# Patient Record
Sex: FEMALE | Race: WHITE | NOT HISPANIC OR LATINO
[De-identification: names, ages, dates, MRNs, and addresses within clinical notes are randomized per-mention and may not be internally consistent; named-entity substitution may affect disease eponyms.]

---

## 2021-09-10 PROBLEM — Z00.00 ENCOUNTER FOR PREVENTIVE HEALTH EXAMINATION: Status: ACTIVE | Noted: 2021-09-10

## 2021-10-13 ENCOUNTER — APPOINTMENT (OUTPATIENT)
Dept: SURGERY | Facility: CLINIC | Age: 76
End: 2021-10-13

## 2022-09-26 ENCOUNTER — TRANSCRIPTION ENCOUNTER (OUTPATIENT)
Age: 77
End: 2022-09-26

## 2023-08-08 ENCOUNTER — NON-APPOINTMENT (OUTPATIENT)
Age: 78
End: 2023-08-08

## 2023-08-08 ENCOUNTER — APPOINTMENT (OUTPATIENT)
Dept: VASCULAR SURGERY | Facility: CLINIC | Age: 78
End: 2023-08-08
Payer: MEDICARE

## 2023-08-08 VITALS — BODY MASS INDEX: 27.97 KG/M2 | WEIGHT: 152 LBS | HEIGHT: 62 IN

## 2023-08-08 PROCEDURE — 99204 OFFICE O/P NEW MOD 45 MIN: CPT | Mod: 57

## 2023-08-08 NOTE — REASON FOR VISIT
[Initial Evaluation] : an initial evaluation [Family Member] : family member [FreeTextEntry1] : RLE pain

## 2023-08-08 NOTE — DATA REVIEWED
[FreeTextEntry1] : Right lower extremity venous duplex performed on 7/20/23 - reviewed.   ELAN 0.5 on R.

## 2023-08-08 NOTE — HISTORY OF PRESENT ILLNESS
[FreeTextEntry1] : 78 yo female presents for an evaluation of right lower extremity pain at rest. The patient develops severe pain in her right foot when she is laying down at night or with elevates the leg The pain wakes her up at night, requiring her to dangle her foot off the bed  or ambulate to shake off the pain. She underwent a cardiac ablation via left femoral access. She reports that they intended but were unable to access via right access.  The patient currently take Eliquis to manage her AF. The patient has a history of endometrial CA, SBOs requiring bowel resection, ventral hernias, mesh infeciton with sepsis, hypercalcemia.  Review of systems: 12 system ROS is negative except for as per HPI.

## 2023-08-08 NOTE — PHYSICAL EXAM
[Normal Thyroid] : the thyroid was normal [0] : right 0 [Calm] : calm [2+] : left 2+ [Ankle Swelling (On Exam)] : present [Varicose Veins Of Lower Extremities] : present [JVD] : no jugular venous distention  [] : not present [de-identified] : NAD [de-identified] : NCAT. Extraocular muscles intact. [FreeTextEntry1] : biphasic dp signal on the right [de-identified] : Soft, NT, ND. No guarding. No palpable masses. No HSM. [de-identified] : No CVA tenderness. [de-identified] : Normal muscle bulk and tone. FROM in all extremities. [de-identified] : AAOx3, CN II-XII intact. Strength 5/5 in all 4 extremities. Sensation intact throughout. [de-identified] : Appropriate affect.

## 2023-08-08 NOTE — ASSESSMENT
[FreeTextEntry1] : 78 yo female with a history of AF and endometrial CA, presents for right lower extremity PAD with rest pain. The patient does not have palpable pulses on the right and diminished ELAN. I recommended the patient undergo an angiogram and possible angioplasty. Risks and benefits of the procedure were discussed with the patient and her daughter in detail. The patient agrees to proceed.   She was instructed to stop Eliquis 48 hours prior to the procedure.  49 min  [Arterial/Venous Disease] : arterial/venous disease

## 2023-08-15 ENCOUNTER — APPOINTMENT (OUTPATIENT)
Dept: VASCULAR SURGERY | Facility: CLINIC | Age: 78
End: 2023-08-15

## 2023-09-07 ENCOUNTER — RESULT REVIEW (OUTPATIENT)
Age: 78
End: 2023-09-07

## 2023-09-07 ENCOUNTER — APPOINTMENT (OUTPATIENT)
Dept: VASCULAR SURGERY | Facility: HOSPITAL | Age: 78
End: 2023-09-07

## 2023-09-08 ENCOUNTER — TRANSCRIPTION ENCOUNTER (OUTPATIENT)
Age: 78
End: 2023-09-08

## 2023-09-08 ENCOUNTER — RESULT REVIEW (OUTPATIENT)
Age: 78
End: 2023-09-08

## 2023-09-19 ENCOUNTER — APPOINTMENT (OUTPATIENT)
Dept: VASCULAR SURGERY | Facility: CLINIC | Age: 78
End: 2023-09-19
Payer: MEDICARE

## 2023-09-19 PROCEDURE — 99213 OFFICE O/P EST LOW 20 MIN: CPT

## 2024-02-05 ENCOUNTER — APPOINTMENT (OUTPATIENT)
Dept: VASCULAR SURGERY | Facility: CLINIC | Age: 79
End: 2024-02-05
Payer: MEDICARE

## 2024-02-05 VITALS
SYSTOLIC BLOOD PRESSURE: 162 MMHG | DIASTOLIC BLOOD PRESSURE: 90 MMHG | HEIGHT: 62 IN | WEIGHT: 158 LBS | BODY MASS INDEX: 29.08 KG/M2 | HEART RATE: 67 BPM

## 2024-02-05 DIAGNOSIS — I48.20 CHRONIC ATRIAL FIBRILLATION, UNSP: ICD-10-CM

## 2024-02-05 DIAGNOSIS — Z87.898 PERSONAL HISTORY OF OTHER SPECIFIED CONDITIONS: ICD-10-CM

## 2024-02-05 DIAGNOSIS — Z86.39 PERSONAL HISTORY OF OTHER ENDOCRINE, NUTRITIONAL AND METABOLIC DISEASE: ICD-10-CM

## 2024-02-05 DIAGNOSIS — Z87.891 PERSONAL HISTORY OF NICOTINE DEPENDENCE: ICD-10-CM

## 2024-02-05 DIAGNOSIS — G62.9 POLYNEUROPATHY, UNSPECIFIED: ICD-10-CM

## 2024-02-05 DIAGNOSIS — M79.604 PAIN IN RIGHT LEG: ICD-10-CM

## 2024-02-05 DIAGNOSIS — Z78.9 OTHER SPECIFIED HEALTH STATUS: ICD-10-CM

## 2024-02-05 DIAGNOSIS — E53.8 DEFICIENCY OF OTHER SPECIFIED B GROUP VITAMINS: ICD-10-CM

## 2024-02-05 DIAGNOSIS — Z86.59 PERSONAL HISTORY OF OTHER MENTAL AND BEHAVIORAL DISORDERS: ICD-10-CM

## 2024-02-05 DIAGNOSIS — Z85.42 PERSONAL HISTORY OF MALIGNANT NEOPLASM OF OTHER PARTS OF UTERUS: ICD-10-CM

## 2024-02-05 DIAGNOSIS — Z87.39 PERSONAL HISTORY OF OTHER DISEASES OF THE MUSCULOSKELETAL SYSTEM AND CONNECTIVE TISSUE: ICD-10-CM

## 2024-02-05 PROCEDURE — 99214 OFFICE O/P EST MOD 30 MIN: CPT

## 2024-02-05 PROCEDURE — 93922 UPR/L XTREMITY ART 2 LEVELS: CPT

## 2024-02-05 RX ORDER — ASPIRIN 81 MG
81 TABLET, DELAYED RELEASE (ENTERIC COATED) ORAL
Refills: 0 | Status: ACTIVE | COMMUNITY

## 2024-02-05 RX ORDER — LIDOCAINE AND PRILOCAINE 25; 25 MG/G; MG/G
2.5-2.5 CREAM TOPICAL
Refills: 0 | Status: ACTIVE | COMMUNITY

## 2024-02-05 RX ORDER — LANSOPRAZOLE 30 MG/1
30 CAPSULE, DELAYED RELEASE ORAL
Refills: 0 | Status: ACTIVE | COMMUNITY

## 2024-02-05 RX ORDER — STANNOUS FLUORIDE 0.45 G/100G
100 PASTE DENTAL
Refills: 0 | Status: ACTIVE | COMMUNITY

## 2024-02-05 RX ORDER — APIXABAN 5 MG/1
TABLET, FILM COATED ORAL
Refills: 0 | Status: ACTIVE | COMMUNITY

## 2024-02-05 RX ORDER — ERGOCALCIFEROL 1.25 MG/1
1.25 MG CAPSULE ORAL
Refills: 0 | Status: ACTIVE | COMMUNITY

## 2024-02-05 NOTE — END OF VISIT
[FreeTextEntry3] : All medical record entries made by the Meaghanibe were at my, BEBA DEVINE, direction and personally dictated by me on 02/05/2024. I have reviewed the chart and agree that the record accurately reflects my personal performance of the history, physical exam, assessment and plan. I have also personally directed, reviewed, and agreed with the chart.

## 2024-02-05 NOTE — ASSESSMENT
[FreeTextEntry1] : 79 yo female with a history of AFib and endometrial CA, Patient is angiogram with angioplasty and DCB of SFA on 9/7/2023 by . The patient has developed burning right lower extremity pain s/p a fall approximately 11 weeks ago. She underwent an arterial duplex that a mild stenosis of the distal SFA.  The patient has a palpable DP  pulse on the right . She has normal ABIs bl.  We discussed undergoing a repeat right lower extremity angiogram for further evaluation. I highly discouraged the patient to undergo an angiogram. The patient believes the angiogram helped her in the past. I explained to her that her ELAN was 0.5 at the time of the last procedure and it is now normal with a pulse in her foot.  Risks and benefits including infection and bleeding, were discussed with the patient in detail. I explained to the patient that her symptoms may not improve after the procedure because they may be secondary to her neuropathy. The patient would like proceed  despite my  recommendation. I recommended the patient also follow up with a neurologist.   She will need to discontinue Eliquis for the pocedure.

## 2024-02-05 NOTE — ADDENDUM
[FreeTextEntry1] : Documented by Gely Gibbons acting as a scribe for BEBA DEVINE on 02/05/2024.
Abdomen soft, non-tender, no guarding.

## 2024-02-05 NOTE — DATA REVIEWED
[FreeTextEntry1] : ELAN/PVR testing - personally reviewed - Normal ABIs bilaterally  Arterial Duplex - optum - stenosis distal SFA

## 2024-02-05 NOTE — PHYSICAL EXAM
[2+] : right 2+ [0] : left 0 [Ankle Swelling (On Exam)] : present [Ankle Swelling On The Right] : of the right ankle [Alert] : alert [Oriented to Person] : oriented to person [Oriented to Place] : oriented to place [Oriented to Time] : oriented to time [JVD] : no jugular venous distention  [] : not present [de-identified] : Awake and Alert. [de-identified] : Right shin discoloration [de-identified] : No gross motor or sensory deficits. [de-identified] : Appropriate affect.

## 2024-02-05 NOTE — REVIEW OF SYSTEMS
[Lower Ext Edema] : lower extremity edema [Limb Pain] : limb pain [Limb Swelling] : limb swelling [Fever] : no fever [Chills] : no chills [Leg Claudication] : no intermittent leg claudication [Limb Weakness] : limb weakness [Difficulty Walking] : difficulty walking

## 2024-02-05 NOTE — HISTORY OF PRESENT ILLNESS
[FreeTextEntry1] : 77 yo female presents for an evaluation of right lower extremity pain at rest. The patient develops severe pain in her right foot when she is laying down at night or with elevates the leg The pain wakes her up at night, requiring her to dangle her foot off the bed  or ambulate to shake off the pain. She underwent a cardiac ablation via left femoral access. She reports that they intended but were unable to access via right access.  The patient currently take Eliquis to manage her AF. The patient has a history of endometrial CA, SBOs requiring bowel resection, ventral hernias, mesh infeciton with sepsis, hypercalcemia.   [de-identified] : 77 yo female, previously seen by , returns in follow up. She is s/p RLE angiogram demonstrating near-occlusive sfa disease with PTA/DCB of the SFA lesions. She reports that her pain resolved completely post procedure.  The patient reports that she has worsening pain  s/p a fall approximately 11 weeks ago. She reports that she underwent an extensive work up which was negative except for a stenosis on arterial duplex. The patient reports intermittent burning pain that radiates up her right leg. She reports her symptoms do not worsen with ambulation. The patient has a history of neuropathy. She continues to take Eliquis to manage her AF.

## 2024-02-26 ENCOUNTER — RESULT REVIEW (OUTPATIENT)
Age: 79
End: 2024-02-26

## 2024-02-29 ENCOUNTER — APPOINTMENT (OUTPATIENT)
Dept: VASCULAR SURGERY | Facility: CLINIC | Age: 79
End: 2024-02-29

## 2024-03-01 ENCOUNTER — RESULT REVIEW (OUTPATIENT)
Age: 79
End: 2024-03-01

## 2024-03-01 ENCOUNTER — APPOINTMENT (OUTPATIENT)
Dept: VASCULAR SURGERY | Facility: HOSPITAL | Age: 79
End: 2024-03-01

## 2024-03-08 ENCOUNTER — TRANSCRIPTION ENCOUNTER (OUTPATIENT)
Age: 79
End: 2024-03-08

## 2024-03-15 ENCOUNTER — APPOINTMENT (OUTPATIENT)
Dept: VASCULAR SURGERY | Facility: CLINIC | Age: 79
End: 2024-03-15
Payer: MEDICARE

## 2024-03-15 VITALS
SYSTOLIC BLOOD PRESSURE: 138 MMHG | WEIGHT: 159 LBS | BODY MASS INDEX: 29.26 KG/M2 | DIASTOLIC BLOOD PRESSURE: 76 MMHG | HEIGHT: 62 IN

## 2024-03-15 PROCEDURE — 99213 OFFICE O/P EST LOW 20 MIN: CPT

## 2024-03-15 NOTE — ASSESSMENT
[FreeTextEntry1] : 77 yo female s/p an angiogram and angioplasty of the Ak pop for vague leg pain. She reports an improvement in her lower extremity pain post procedure. She was also started on a medication for a neuropathy. She is taking Aspirin and Eliquis as prescribed. She will follow up in several months sooner if she develops a problem.

## 2024-03-15 NOTE — REVIEW OF SYSTEMS
[Fever] : no fever [Chills] : no chills [Leg Claudication] : no intermittent leg claudication [Lower Ext Edema] : lower extremity edema [Limb Pain] : limb pain [Limb Swelling] : limb swelling [Limb Weakness] : limb weakness [Difficulty Walking] : difficulty walking

## 2024-03-15 NOTE — HISTORY OF PRESENT ILLNESS
[FreeTextEntry1] : 77 yo female presents for an evaluation of right lower extremity pain at rest. The patient develops severe pain in her right foot when she is laying down at night or with elevates the leg The pain wakes her up at night, requiring her to dangle her foot off the bed  or ambulate to shake off the pain. She underwent a cardiac ablation via left femoral access. She reports that they intended but were unable to access via right access.  The patient currently take Eliquis to manage her AF. The patient has a history of endometrial CA, SBOs requiring bowel resection, ventral hernias, mesh infeciton with sepsis, hypercalcemia.   [de-identified] : 79 yo female returns in follow up. She is s/p a right lower extremity angiogram and intervention. She reports that she is doing well post procedure. She reports that her leg pain has improved post procedure. She reports continued lower extremity edema. She was started on medication for a her neuropathy by her PMD post procedure.  She is taking Aspirin and Eliquis as prescribed.

## 2024-03-15 NOTE — PHYSICAL EXAM
[JVD] : no jugular venous distention  [0] : left 0 [2+] : right 2+ [Ankle Swelling (On Exam)] : present [Ankle Swelling On The Right] : of the right ankle [Alert] : alert [] : not present [Oriented to Person] : oriented to person [Oriented to Place] : oriented to place [de-identified] : Awake and Alert. [Oriented to Time] : oriented to time [de-identified] : Right shin discoloration and superficial wound, no left groin hematoma- suture removed [de-identified] : No gross motor or sensory deficits. [de-identified] : Appropriate affect.

## 2024-06-07 ENCOUNTER — APPOINTMENT (OUTPATIENT)
Dept: VASCULAR SURGERY | Facility: CLINIC | Age: 79
End: 2024-06-07
Payer: MEDICARE

## 2024-06-07 VITALS
TEMPERATURE: 97.6 F | BODY MASS INDEX: 29.63 KG/M2 | WEIGHT: 161 LBS | RESPIRATION RATE: 16 BRPM | HEIGHT: 62 IN | HEART RATE: 58 BPM | OXYGEN SATURATION: 98 % | SYSTOLIC BLOOD PRESSURE: 124 MMHG | DIASTOLIC BLOOD PRESSURE: 58 MMHG

## 2024-06-07 DIAGNOSIS — I87.2 VENOUS INSUFFICIENCY (CHRONIC) (PERIPHERAL): ICD-10-CM

## 2024-06-07 DIAGNOSIS — I73.9 PERIPHERAL VASCULAR DISEASE, UNSPECIFIED: ICD-10-CM

## 2024-06-07 PROCEDURE — 99213 OFFICE O/P EST LOW 20 MIN: CPT

## 2024-06-07 RX ORDER — HYDROCHLOROTHIAZIDE 25 MG/1
25 TABLET ORAL
Refills: 0 | Status: DISCONTINUED | COMMUNITY
End: 2024-06-07

## 2024-06-07 RX ORDER — LOSARTAN POTASSIUM 100 MG/1
TABLET, FILM COATED ORAL
Refills: 0 | Status: DISCONTINUED | COMMUNITY
End: 2024-06-07

## 2024-06-07 RX ORDER — CYANOCOBALAMIN 1000 UG/ML
1000 INJECTION INTRAMUSCULAR; SUBCUTANEOUS
Refills: 0 | Status: DISCONTINUED | COMMUNITY
End: 2024-06-07

## 2024-06-07 RX ORDER — SPIRONOLACTONE 50 MG/1
50 TABLET ORAL DAILY
Refills: 0 | Status: ACTIVE | COMMUNITY

## 2024-06-07 RX ORDER — FUROSEMIDE 20 MG/1
20 TABLET ORAL
Refills: 0 | Status: DISCONTINUED | COMMUNITY
End: 2024-06-07

## 2024-06-07 RX ORDER — SENNOSIDES 8.6 MG/1
8.6 CAPSULE, GELATIN COATED ORAL
Refills: 0 | Status: DISCONTINUED | COMMUNITY
End: 2024-06-07

## 2024-06-07 RX ORDER — PNV NO.95/FERROUS FUM/FOLIC AC 28MG-0.8MG
TABLET ORAL
Refills: 0 | Status: ACTIVE | COMMUNITY

## 2024-06-07 RX ORDER — DOCUSATE SODIUM 100 MG/1
CAPSULE ORAL 3 TIMES DAILY
Refills: 0 | Status: ACTIVE | COMMUNITY

## 2024-06-07 NOTE — PHYSICAL EXAM
[0] : left 0 [JVD] : no jugular venous distention  [2+] : right 2+ [Ankle Swelling (On Exam)] : present [Ankle Swelling On The Right] : mild [] : not present [Alert] : alert [Oriented to Person] : oriented to person [Oriented to Place] : oriented to place [Oriented to Time] : oriented to time [de-identified] : Awake and Alert. [de-identified] : Right shin discoloration  [de-identified] : No gross motor or sensory deficits. [de-identified] : Appropriate affect.

## 2024-06-07 NOTE — HISTORY OF PRESENT ILLNESS
[FreeTextEntry1] : 79 yo female presents for an evaluation of right lower extremity pain at rest. The patient develops severe pain in her right foot when she is laying down at night or with elevates the leg The pain wakes her up at night, requiring her to dangle her foot off the bed  or ambulate to shake off the pain. She underwent a cardiac ablation via left femoral access. She reports that they intended but were unable to access via right access.  The patient currently take Eliquis to manage her AF. The patient has a history of endometrial CA, SBOs requiring bowel resection, ventral hernias, mesh infeciton with sepsis, hypercalcemia.   [de-identified] : 77 yo female returns in follow up. She is s/p a right lower extremity angiogram and intervention. She reports that she initially did well post procedure. She reports that she now has pain. She reports that her pain is unchanged.  She reports continued lower extremity edema and burning pain in her right leg. She reports that she walks for 1 hour per day. She has no pain with ambulation. She has pain at night and the pain is making it difficult to sleep.  She is taking Aspirin and Eliquis as prescribed.

## 2024-06-07 NOTE — ASSESSMENT
[FreeTextEntry1] : 79 yo female s/p an angiogram and angioplasty of the Ak pop for vague leg pain. She reported an initial improvement in her pain. She now has recurrent pain. She continues to have a 2+ dp pulse and I once again reiterated that I do not think the pain is arterial in etiology. She has mild edema of the leg and skin changes of the lower leg. I recommended that she undergo a venous duplex. She will follow up when the results of the us are available . Further treatment will depend upon the results of the venous duplex. She is taking Aspirin and Eliquis as prescribed.

## 2024-07-19 ENCOUNTER — APPOINTMENT (OUTPATIENT)
Dept: HEART AND VASCULAR | Facility: CLINIC | Age: 79
End: 2024-07-19
Payer: MEDICARE

## 2024-07-19 ENCOUNTER — APPOINTMENT (OUTPATIENT)
Dept: VASCULAR SURGERY | Facility: CLINIC | Age: 79
End: 2024-07-19
Payer: MEDICARE

## 2024-07-19 DIAGNOSIS — G62.9 POLYNEUROPATHY, UNSPECIFIED: ICD-10-CM

## 2024-07-19 DIAGNOSIS — I87.2 VENOUS INSUFFICIENCY (CHRONIC) (PERIPHERAL): ICD-10-CM

## 2024-07-19 DIAGNOSIS — I73.9 PERIPHERAL VASCULAR DISEASE, UNSPECIFIED: ICD-10-CM

## 2024-07-19 PROCEDURE — 99213 OFFICE O/P EST LOW 20 MIN: CPT

## 2024-07-19 PROCEDURE — 93971 EXTREMITY STUDY: CPT
